# Patient Record
Sex: FEMALE | NOT HISPANIC OR LATINO | URBAN - METROPOLITAN AREA
[De-identification: names, ages, dates, MRNs, and addresses within clinical notes are randomized per-mention and may not be internally consistent; named-entity substitution may affect disease eponyms.]

---

## 2020-07-20 ENCOUNTER — IMPORTED ENCOUNTER (OUTPATIENT)
Dept: URBAN - METROPOLITAN AREA CLINIC 31 | Facility: CLINIC | Age: 30
End: 2020-07-20

## 2020-07-20 PROCEDURE — 92015 DETERMINE REFRACTIVE STATE: CPT

## 2020-07-20 PROCEDURE — 92004 COMPRE OPH EXAM NEW PT 1/>: CPT

## 2020-07-20 PROCEDURE — 92310 CONTACT LENS FITTING OU: CPT

## 2020-07-20 NOTE — PATIENT DISCUSSION
1.  Myope--  no rx changes2. Pt got a n rx for cls in [place] 2018 but they gave her a weaker rx and pt did not like it. Order Oasys 14 day torics in same rx as 2016 and give pt trials to try.   Eval 100 with copay $30.   Has [facility] with materials $125. 3.  Minimal rx change [id number]  RTN 1 yr CE  Peggy at [place] in [place]

## 2022-04-02 ASSESSMENT — TONOMETRY
OD_IOP_MMHG: 16
OS_IOP_MMHG: 16

## 2022-04-02 ASSESSMENT — VISUAL ACUITY
OS_SC: J16
OD_SC: J16
OD_CC: CF@4'
OS_CC: CF@4'